# Patient Record
Sex: FEMALE | Race: WHITE | NOT HISPANIC OR LATINO | Employment: UNEMPLOYED | URBAN - METROPOLITAN AREA
[De-identification: names, ages, dates, MRNs, and addresses within clinical notes are randomized per-mention and may not be internally consistent; named-entity substitution may affect disease eponyms.]

---

## 2024-10-16 ENCOUNTER — TELEPHONE (OUTPATIENT)
Dept: MATERNAL FETAL MEDICINE | Facility: CLINIC | Age: 34
End: 2024-10-16
Payer: MEDICAID

## 2024-10-16 DIAGNOSIS — O36.63X0 EXCESSIVE FETAL GROWTH AFFECTING MANAGEMENT OF PREGNANCY IN THIRD TRIMESTER, SINGLE OR UNSPECIFIED FETUS: Primary | ICD-10-CM

## 2024-10-16 DIAGNOSIS — O36.5930 POOR FETAL GROWTH AFFECTING MANAGEMENT OF MOTHER IN THIRD TRIMESTER: ICD-10-CM

## 2024-10-16 DIAGNOSIS — O12.10 PROTEINURIA DURING PREGNANCY: ICD-10-CM

## 2024-10-16 DIAGNOSIS — O09.523 MULTIGRAVIDA OF ADVANCED MATERNAL AGE IN THIRD TRIMESTER: Primary | ICD-10-CM

## 2024-10-16 PROBLEM — O26.13 INSUFFICIENT WEIGHT GAIN DURING PREGNANCY IN THIRD TRIMESTER: Status: ACTIVE | Noted: 2024-10-16

## 2024-10-16 PROBLEM — O09.43 GRAND MULTIPARITY WITH CURRENT PREGNANCY IN THIRD TRIMESTER: Status: ACTIVE | Noted: 2024-10-16

## 2024-10-16 PROBLEM — O09.213 PREGNANCY COMPLICATED BY PREVIOUS PRETERM LABOR IN THIRD TRIMESTER: Status: ACTIVE | Noted: 2024-10-16

## 2024-10-16 PROBLEM — O16.9 ELEVATED BLOOD PRESSURE AFFECTING PREGNANCY, ANTEPARTUM: Status: ACTIVE | Noted: 2024-10-16

## 2024-10-16 NOTE — PROGRESS NOTES
Maternal Fetal Medicine New Consult    Subjective:     Patient ID: [id number]    Chief Complaint: mfm consult w/us      HPI: 34 y.o.  female  at 38w6d gestation with Estimated Date of Delivery:  10/25/2024. by early US, not consistent with LMP. She is sent for MFM consultation for S<D, proteinuria, weight loss.     She had concern for fetal size less than dates with her midwife on 10/16/2024.  She had trace proteinuria on dipstick on 10/16/2024.  She also had elevated BP x1 at 141/97 at that visit.  She denies any known history of hypertension.  She has history of a  delivery at 35 weeks.  Of note, patient has only gained about 8 lb throughout the whole pregnancy, as of initial MFM consult at 39 weeks.   Patient is of advanced maternal age at 34 years old.  She has not had any genetic testing during the pregnancy.  She is grand multiparous with 5 previous vaginal deliveries.  She denies any history of postpartum hemorrhage and has never needed a blood transfusion.    She states that she has been under some stress recently and lost a few lb in the last 3 weeks, otherwise pregnancy has been uncomplicated.  Patient was accompanied by her  Prince       She denies any personal or family history of aneuploidy or anomalies. She denies any exposure to high fevers, viral rashes, illicit drugs or alcohol in this pregnancy.  She denies any leaking fluid, vaginal bleeding, contractions, decreased fetal movement. Denies headaches, visual disturbances, or epigastric pain.       Pregnancy complications include:  Patient Active Problem List   Diagnosis    Elevated blood pressure affecting pregnancy, antepartum    FGR, not seen    Insufficient weight gain during pregnancy in third trimester    Pregnancy complicated by previous  labor in third trimester    Multigravida of advanced maternal age in third trimester    Grand multiparity with current pregnancy in third trimester    Pyelectasis of fetus on  "prenatal ultrasound       Past Medical History:   Diagnosis Date    anxiety 2022    currently sometimes       Past Surgical History:   Procedure Laterality Date    APPENDECTOMY  1997    TONSILLECTOMY  1993    WISDOM TOOTH EXTRACTION  2004    All four were removed       Family History   Problem Relation Name Age of Onset    Miscarriages / Stillbirths Mother         Social History     Socioeconomic History    Marital status:    Tobacco Use    Smoking status: Former     Types: Cigarettes    Smokeless tobacco: Never   Substance and Sexual Activity    Alcohol use: Not Currently    Drug use: Not Currently     Types: Marijuana     Comment: Medical marijuana because of anxiety and eating disorder    Sexual activity: Yes     Partners: Male       Current Outpatient Medications   Medication Sig Dispense Refill    iron fum,ps/folic/Bcomp,C no.9 (IRON FOLATE PLUS ORAL) Take by mouth.      pyridoxine, vitamin B6, (VITAMIN B-6) 100 MG Tab Take 50 mg by mouth once daily.      vitamin D (VITAMIN D3) 1000 units Tab Take 1,000 Units by mouth once daily.      prenatal vit,anjana 74-iron-folic 27 mg iron- 1 mg Tab Take by mouth.       No current facility-administered medications for this visit.       Review of patient's allergies indicates:   Allergen Reactions    Latex, natural rubber Itching        Review of Systems   12 point review of systems conducted, negative except as stated in the history of present illness. See HPI for details.      Objective:     Visit Vitals  /75 (BP Location: Left arm, Patient Position: Sitting)   Pulse 66   Ht 5' 4" (1.626 m)   Wt 69.1 kg (152 lb 6.4 oz)   LMP  (LMP Unknown)   BMI 26.16 kg/m²        Physical Exam  Vitals and nursing note reviewed.   Constitutional:       General: She is not in acute distress.     Appearance: Normal appearance.   HENT:      Head: Normocephalic and atraumatic.      Nose: Nose normal. No congestion.      Mouth/Throat:      Pharynx: Oropharynx is clear.   Eyes:      " General: No scleral icterus.     Conjunctiva/sclera: Conjunctivae normal.   Cardiovascular:      Rate and Rhythm: Normal rate and regular rhythm.   Pulmonary:      Effort: No respiratory distress.      Breath sounds: Normal breath sounds. No wheezing.   Abdominal:      General: Abdomen is flat.      Palpations: Abdomen is soft.      Tenderness: There is no abdominal tenderness. There is no right CVA tenderness, left CVA tenderness or guarding.      Comments: No CVA tenderness gravid uterus.    Musculoskeletal:         General: Normal range of motion.      Cervical back: Neck supple.      Right lower leg: No edema.      Left lower leg: No edema.   Skin:     General: Skin is warm.      Findings: No bruising or rash.   Neurological:      General: No focal deficit present.      Mental Status: She is oriented to person, place, and time.      Deep Tendon Reflexes: Reflexes normal.      Comments: Normal reflexes   Psychiatric:         Mood and Affect: Mood normal.         Behavior: Behavior normal.         Thought Content: Thought content normal.         Judgment: Judgment normal.         Assessment/Plan:     34 y.o.  female with IUP at 38w6d    Concern for FGR, not seen  There is normal fetal growth with an EFW of 3501 g at the 60% and the AC at the 86% on 10/17/24.  AFV is normal. BPP is 8/8 today (10/17/2024).  Normal umbilical artery Doppler today 10/17/2024.    Causes of fetal growth restriction was discussed briefly.  There is normal fetal growth at this time.  Fetal kick count instructions discussed.      Elevated BP x1 without known history of hypertension  Vitals:    10/17/24 1026 10/17/24 1028   BP: 133/84 124/75     She is asymptomatic.  Although BP is normal today, it was mildly elevated at her prenatal visit yesterday.  Out of caution, we will order preeclampsia labs.  Patient will go do the lab work today.    Discussed risks with hypertension in pregnancy, including FGR, abruption and  preeclampsia/eclampsia. She was given instructions to come in immediately if she has decreased fetal movements, headaches, vision problems, or epigastric pain.  She is also to come in if blood pressure is at or over 160 systolic or 105 diastolic.      If gestational hypertension or preeclampsia confirmed, delivery would be recommended at that time as patient is greater than 37 weeks gestation and risks of prematurity are outweighed by risks of continued pregnancy. Preeclampsia precautions given.        Insufficient weight gain in pregnancy  Discussed weight gain recommendations for pregnancy of about 25-35 lb at a normal BMI at the start of pregnancy.  Advised her to increase her caloric intake and continue healthy diet.      Advanced maternal age  I discussed with her that the higher risk of aneuploidy related to advanced maternal age is caused by meiotic nondysjunction.  At this maternal  age, the risk of Down syndrome quoted at 1:267 and the risk of any chromosomal problems quoted at 1:148. She would not consider termination of pregnancy even if anomalies or aneuploidy is detected. She was advised of the screening nature of the Level 2 ultrasound as well as the screening nature of a quad screen to check for the risk of aneuploidy with normal ultrasound and or quad screen testing that would lower the background risk of aneuploidy.        She was counseled on Cell free DNA understanding that it is an enhanced screening test but not a diagnostic test. It assesses risk for common aneuploidies such as Trisomy 13, 18, and 21 by evaluating cell-free fetal DNA in maternal circulation with a false positive rate less than 0.5% for Trisomy 21 and less reliable for Trisomy 13 and Trisomy 18. She declined cell free DNA .      She was advised that the only diagnostic test at this time is genetic amniocentesis.  Benefits and risks of a genetic amniocentesis were discussed. Patient was offered genetic amniocentesis, after  thorough counseling on benefits and risks of procedure, with very remote risk of complications and in some studies no identifiable increased risk, above background risk of spontaneous miscarriage, while some current data suggests risk up to 1 in 800 with early amniocentesis prior to 24 weeks, and remote risk of need for emergency delivery with all the complications of prematurity with amniocentesis after 24 weeks.     I also discussed with them that cell free DNA will not detect other genetic diseases or more subtle chromosomal abnormalities like microdeletions or duplications. The added benefit of doing chromosomal microarray analysis on amniotic fluid is that it would detect clinically relevant deletions or duplications in about 1:60 (1.7%) when the indication is abnormal quad screen or advanced maternal age, and 6.0 % when a structural anomaly is present. The concern about microarray analysis is that it could give uncertain findings in about 1.5-2.0% of cases that would increase anxiety and may require specialized genetic counseling.     In a recent study from 2018, clinically significant chromosomal microarray aberrations were seen in 4.7% of pregnancies with US anomalies (excluding soft markers for aneuploidy), but including patients with isolated FGR and polyhydramnios. The most common abnormality seen with cardiovascular defects was 22q11.21 deletion (DiGeorge-velocardiofacial syndrome), and the most common abnormality among genitourinary malformations was 17q12 deletion (encompassing HFNF1B). Aberrations were present in 13.1% if multiple anomalies, and around 4 % if single ultrasonographic anomaly.     She declined amniocentesis . She is aware of the need for  evaluation.    The higher risk of anomalies associated with advanced maternal age was also addressed. The reassurance obtained by the normal ultrasound and the limitations of ultrasound in checking for anomalies was explained with the need for  regular  evaluations.       Right fetal pyelectasis  AP diameter of R renal pelvis measures 8.9 mm on 10/17/24.     She was advised that with mild fetal pyelectasis there is increased risk of aneuploidy, two fold above what is expected based on maternal age and or quad screen risk.    Pyelectasis may be transient or physiologic in 50-70% or due to ureteropelvic junction obstruction in 10-30%, vesicoureteral reflux in 10-40%, or lower urinary tract obstruction (LUTO) in 10-15%. It has been described as a weak marker for aneuploidy, particularly with a likelihood ratio of 1.5-2.0 above what is expected based on maternal age and/or quad screen risk.  Genetic testing discussion as above.    Ureteropelvic junction obstruction may be due to an abnormal muscle layer, abnormal neural innervation, or an accessory vessel crossing at the UPJ resulting in dilatation of the renal pelvis and calyces. It may be bilateral in 10% and may be associated with a renal abnormality in the contralateral kidney in 25% and extrarenal anomalies in 10%.    Hydronephrosis, or the presence of both renal pelvis and calyceal dilatation, increases the likelihood for  urologic surgery and/ or long-term renal impairment. Long-term prognosis depends on the presence of coexisting anomalies or aneuploidy, and whether one or both kidneys are affected. The prognosis is excellent if it is unilateral. Many cases may resolve spontaneously and need no treatment. There is an increased risk for renal impairment if the prenatal AP diameter is 10 mm or greater. Only 20-25% of infants with UPJ will require surgery. Corrective surgery is highly successful in improving renal function, if impaired. Open surgery pyeloplasty with resection of the narrow UPJ segment is successful in 95% with a recurrence rate of 5%.    If persistent pyelectasis, recommend   ultrasound 1 week after birth with pediatrician/pediatric urologist. If  persistent pyelectasis, baby will need referral to pediatric urologist.    I discussed with her that antenatally as long as there is normal amniotic fluid volume then expectant management will be continued through the pregnancy. Fetal kick count instructions were given. She was advised to report any decreased fetal movement.      Grand multiparity  She was advised of the increased risk of malpresentation, abnormal labors, and risk of postpartum hemorrhage and expectant management needs to be done. Consider using prophylactic dose of uterotonic agents after delivery to reduce the risk of postpartum hemorrhage.    Recommend flagging the chart on admission as high risk for postpartum hemorrhage to have blood type and screened and be prepared in the event that that happens.       Previous  delivery  At this gestational age, expectant management to be done.  Labor precautions given.    Advanced maternal age with mild right fetal pyelectasis.  Patient was not interested in any genetic testing.  We will have the baby checked after birth.  Importance of  ultrasound and referral to pediatric Urology if persistent pyelectasis is seen was discussed.  With elevated blood pressure yesterday with normal blood pressure today and no symptoms out of caution order preeclampsia labs.  Patient and her  we are advised that I am not involved in any way in the delivery planning.  However with grand multiparity and risk of postpartum hemorrhage it would be safer to deliver in the hospital setting.  If confirmed gestational hypertension recommend proceeding with a immediately delivery.    Follow up for None. Keep follow up with primary OB.     No future appointments.       Patient was evaluated by LAUREN Miramontes and Dr. Damico.  Final assessment and recommendations as stated above were made by Dr. Damico.    This note was created with the assistance of [employer] voice recognition software. There may be transcription  errors as a result of using this technology, however minimal. Effort has been made to ensure accuracy of transcription, but any obvious errors or omissions should be clarified with the author of the document.

## 2024-10-17 ENCOUNTER — LAB VISIT (OUTPATIENT)
Dept: LAB | Facility: HOSPITAL | Age: 34
End: 2024-10-17
Payer: MEDICAID

## 2024-10-17 ENCOUNTER — PROCEDURE VISIT (OUTPATIENT)
Dept: MATERNAL FETAL MEDICINE | Facility: CLINIC | Age: 34
End: 2024-10-17
Payer: MEDICAID

## 2024-10-17 ENCOUNTER — OFFICE VISIT (OUTPATIENT)
Dept: MATERNAL FETAL MEDICINE | Facility: CLINIC | Age: 34
End: 2024-10-17
Payer: MEDICAID

## 2024-10-17 VITALS
BODY MASS INDEX: 26.02 KG/M2 | WEIGHT: 152.38 LBS | HEART RATE: 66 BPM | SYSTOLIC BLOOD PRESSURE: 124 MMHG | DIASTOLIC BLOOD PRESSURE: 75 MMHG | HEIGHT: 64 IN

## 2024-10-17 DIAGNOSIS — O36.63X0 EXCESSIVE FETAL GROWTH AFFECTING MANAGEMENT OF PREGNANCY IN THIRD TRIMESTER, SINGLE OR UNSPECIFIED FETUS: ICD-10-CM

## 2024-10-17 DIAGNOSIS — O26.13 INSUFFICIENT WEIGHT GAIN DURING PREGNANCY IN THIRD TRIMESTER: ICD-10-CM

## 2024-10-17 DIAGNOSIS — O09.43 GRAND MULTIPARITY WITH CURRENT PREGNANCY IN THIRD TRIMESTER: ICD-10-CM

## 2024-10-17 DIAGNOSIS — O16.9 ELEVATED BLOOD PRESSURE AFFECTING PREGNANCY, ANTEPARTUM: ICD-10-CM

## 2024-10-17 DIAGNOSIS — O09.213 PREGNANCY COMPLICATED BY PREVIOUS PRETERM LABOR IN THIRD TRIMESTER: ICD-10-CM

## 2024-10-17 DIAGNOSIS — O09.523 MULTIGRAVIDA OF ADVANCED MATERNAL AGE IN THIRD TRIMESTER: ICD-10-CM

## 2024-10-17 DIAGNOSIS — O36.5930 POOR FETAL GROWTH AFFECTING MANAGEMENT OF MOTHER IN THIRD TRIMESTER: ICD-10-CM

## 2024-10-17 DIAGNOSIS — O16.9 ELEVATED BLOOD PRESSURE AFFECTING PREGNANCY, ANTEPARTUM: Primary | ICD-10-CM

## 2024-10-17 DIAGNOSIS — O35.EXX0 PYELECTASIS OF FETUS ON PRENATAL ULTRASOUND: ICD-10-CM

## 2024-10-17 DIAGNOSIS — O36.5930 POOR FETAL GROWTH AFFECTING MANAGEMENT OF MOTHER IN THIRD TRIMESTER, SINGLE OR UNSPECIFIED FETUS: ICD-10-CM

## 2024-10-17 DIAGNOSIS — O12.10 PROTEINURIA DURING PREGNANCY: ICD-10-CM

## 2024-10-17 LAB
ALT SERPL-CCNC: 20 UNIT/L (ref 0–55)
AST SERPL-CCNC: 15 UNIT/L (ref 5–34)
CREAT SERPL-MCNC: 0.62 MG/DL (ref 0.55–1.02)
ERYTHROCYTE [DISTWIDTH] IN BLOOD BY AUTOMATED COUNT: 13.4 % (ref 11.5–17)
GFR SERPLBLD CREATININE-BSD FMLA CKD-EPI: >60 ML/MIN/1.73/M2
HCT VFR BLD AUTO: 37.7 % (ref 37–47)
HGB BLD-MCNC: 12.4 G/DL (ref 12–16)
LDH SERPL-CCNC: 203 U/L (ref 125–220)
MCH RBC QN AUTO: 30.8 PG (ref 27–31)
MCHC RBC AUTO-ENTMCNC: 32.9 G/DL (ref 33–36)
MCV RBC AUTO: 93.8 FL (ref 80–94)
NRBC BLD AUTO-RTO: 0 %
PLATELET # BLD AUTO: 241 X10(3)/MCL (ref 130–400)
PMV BLD AUTO: 11.5 FL (ref 7.4–10.4)
RBC # BLD AUTO: 4.02 X10(6)/MCL (ref 4.2–5.4)
URATE SERPL-MCNC: 4.3 MG/DL (ref 2.6–6)
WBC # BLD AUTO: 8.01 X10(3)/MCL (ref 4.5–11.5)

## 2024-10-17 PROCEDURE — 84460 ALANINE AMINO (ALT) (SGPT): CPT

## 2024-10-17 PROCEDURE — 85027 COMPLETE CBC AUTOMATED: CPT

## 2024-10-17 PROCEDURE — 83615 LACTATE (LD) (LDH) ENZYME: CPT

## 2024-10-17 PROCEDURE — 82565 ASSAY OF CREATININE: CPT

## 2024-10-17 PROCEDURE — 36415 COLL VENOUS BLD VENIPUNCTURE: CPT

## 2024-10-17 PROCEDURE — 84550 ASSAY OF BLOOD/URIC ACID: CPT

## 2024-10-17 PROCEDURE — 84450 TRANSFERASE (AST) (SGOT): CPT

## 2024-10-17 RX ORDER — CHOLECALCIFEROL (VITAMIN D3) 25 MCG
1000 TABLET ORAL DAILY
COMMUNITY

## 2024-10-17 RX ORDER — PYRIDOXINE HCL (VITAMIN B6) 100 MG
50 TABLET ORAL DAILY
COMMUNITY

## 2024-10-17 NOTE — PROGRESS NOTES
Please notify patient of below:    1. Preeclampsia labs were normal, no evidence of preeclampsia at this time

## 2025-08-04 PROBLEM — O09.523 MULTIGRAVIDA OF ADVANCED MATERNAL AGE IN THIRD TRIMESTER: Status: RESOLVED | Noted: 2024-10-16 | Resolved: 2025-08-04

## 2025-08-04 PROBLEM — O36.5930 POOR FETAL GROWTH AFFECTING MANAGEMENT OF MOTHER IN THIRD TRIMESTER: Status: RESOLVED | Noted: 2024-10-16 | Resolved: 2025-08-04

## 2025-08-06 PROBLEM — O09.32 LATE PRENATAL CARE AFFECTING PREGNANCY IN SECOND TRIMESTER: Status: ACTIVE | Noted: 2025-08-06

## 2025-08-06 PROBLEM — O35.EXX0 PYELECTASIS OF FETUS ON PRENATAL ULTRASOUND: Status: RESOLVED | Noted: 2024-10-17 | Resolved: 2025-08-06

## 2025-08-06 PROBLEM — O26.13 INSUFFICIENT WEIGHT GAIN DURING PREGNANCY IN THIRD TRIMESTER: Status: RESOLVED | Noted: 2024-10-16 | Resolved: 2025-08-06

## 2025-08-06 PROBLEM — O16.9 ELEVATED BLOOD PRESSURE AFFECTING PREGNANCY, ANTEPARTUM: Status: RESOLVED | Noted: 2024-10-16 | Resolved: 2025-08-06

## 2025-08-06 PROCEDURE — 87591 N.GONORRHOEAE DNA AMP PROB: CPT | Performed by: STUDENT IN AN ORGANIZED HEALTH CARE EDUCATION/TRAINING PROGRAM

## 2025-08-06 PROCEDURE — 87086 URINE CULTURE/COLONY COUNT: CPT | Performed by: STUDENT IN AN ORGANIZED HEALTH CARE EDUCATION/TRAINING PROGRAM

## 2025-08-06 PROCEDURE — 87661 TRICHOMONAS VAGINALIS AMPLIF: CPT | Performed by: STUDENT IN AN ORGANIZED HEALTH CARE EDUCATION/TRAINING PROGRAM
